# Patient Record
Sex: FEMALE | Race: OTHER | HISPANIC OR LATINO
[De-identification: names, ages, dates, MRNs, and addresses within clinical notes are randomized per-mention and may not be internally consistent; named-entity substitution may affect disease eponyms.]

---

## 2019-03-01 PROBLEM — Z00.00 ENCOUNTER FOR PREVENTIVE HEALTH EXAMINATION: Status: ACTIVE | Noted: 2019-03-01

## 2019-03-05 ENCOUNTER — APPOINTMENT (OUTPATIENT)
Dept: HEART AND VASCULAR | Facility: CLINIC | Age: 59
End: 2019-03-05
Payer: MEDICAID

## 2019-03-05 VITALS
HEART RATE: 72 BPM | SYSTOLIC BLOOD PRESSURE: 170 MMHG | BODY MASS INDEX: 33.13 KG/M2 | HEIGHT: 62 IN | RESPIRATION RATE: 16 BRPM | WEIGHT: 180 LBS | DIASTOLIC BLOOD PRESSURE: 100 MMHG

## 2019-03-05 DIAGNOSIS — Z78.9 OTHER SPECIFIED HEALTH STATUS: ICD-10-CM

## 2019-03-05 DIAGNOSIS — Z82.49 FAMILY HISTORY OF ISCHEMIC HEART DISEASE AND OTHER DISEASES OF THE CIRCULATORY SYSTEM: ICD-10-CM

## 2019-03-05 DIAGNOSIS — E78.5 HYPERLIPIDEMIA, UNSPECIFIED: ICD-10-CM

## 2019-03-05 DIAGNOSIS — Z82.0 FAMILY HISTORY OF EPILEPSY AND OTHER DISEASES OF THE NERVOUS SYSTEM: ICD-10-CM

## 2019-03-05 PROCEDURE — 99204 OFFICE O/P NEW MOD 45 MIN: CPT

## 2019-03-05 NOTE — PHYSICAL EXAM
[General Appearance - Well Developed] : well developed [Normal Appearance] : normal appearance [Well Groomed] : well groomed [General Appearance - Well Nourished] : well nourished [No Deformities] : no deformities [General Appearance - In No Acute Distress] : no acute distress [Normal Oral Mucosa] : normal oral mucosa [No Oral Pallor] : no oral pallor [No Oral Cyanosis] : no oral cyanosis [Heart Rate And Rhythm] : heart rate and rhythm were normal [Heart Sounds] : normal S1 and S2 [Respiration, Rhythm And Depth] : normal respiratory rhythm and effort [Exaggerated Use Of Accessory Muscles For Inspiration] : no accessory muscle use [Auscultation Breath Sounds / Voice Sounds] : lungs were clear to auscultation bilaterally [Abdomen Soft] : soft [Abdomen Tenderness] : non-tender [Abdomen Mass (___ Cm)] : no abdominal mass palpated [Nail Clubbing] : no clubbing of the fingernails [Cyanosis, Localized] : no localized cyanosis [Petechial Hemorrhages (___cm)] : no petechial hemorrhages [] : no ischemic changes [Oriented To Time, Place, And Person] : oriented to person, place, and time [Affect] : the affect was normal [Mood] : the mood was normal [No Anxiety] : not feeling anxious [Systolic grade ___/6] : A grade [unfilled]/6 systolic murmur was heard.

## 2019-03-05 NOTE — REASON FOR VISIT
[Initial Evaluation] : an initial evaluation of [FreeTextEntry1] : 58 year old F with history of HTN diagnosed 12 years ago, family history of HTN here for evaluation of HTN and murmur. She denies chest pain, dyspnea, orthopnea, PND, edema. \par \par EKG yesterday NSR at 61 bpm with STT wave inv I, aVL, V4-V6. \par EKG 05/09/2013: NSR at 79 bpm with nonspecific STT abn in I, aVL, V5-V6

## 2019-03-05 NOTE — DISCUSSION/SUMMARY
[Hypertension] : hypertension [Stable] : stable [Echocardiogram] : echocardiogram [Exercise Regimen] : an exercise regimen [Weight Loss] : weight loss [Sodium Restriction] : sodium restriction [de-identified] :  Amlodipine to 10 mg PO daily - just increased today; keep BP log [FreeTextEntry4] : Murmur - check echocardiogram; Retrieve labs done at [facility] office [FreeTextEntry3] : after testing completed

## 2019-03-13 PROBLEM — I10 HYPERTENSION, UNSPECIFIED TYPE: Status: ACTIVE | Noted: 2019-03-05

## 2019-03-13 PROBLEM — R01.1 MURMUR, CARDIAC: Status: ACTIVE | Noted: 2019-03-13

## 2019-03-18 ENCOUNTER — APPOINTMENT (OUTPATIENT)
Dept: HEART AND VASCULAR | Facility: CLINIC | Age: 59
End: 2019-03-18
Payer: MEDICAID

## 2019-03-18 DIAGNOSIS — I10 ESSENTIAL (PRIMARY) HYPERTENSION: ICD-10-CM

## 2019-03-18 DIAGNOSIS — R01.1 CARDIAC MURMUR, UNSPECIFIED: ICD-10-CM

## 2019-03-18 PROCEDURE — 93306 TTE W/DOPPLER COMPLETE: CPT

## 2019-04-02 ENCOUNTER — APPOINTMENT (OUTPATIENT)
Dept: HEART AND VASCULAR | Facility: CLINIC | Age: 59
End: 2019-04-02
Payer: MEDICAID

## 2019-04-02 VITALS
HEIGHT: 61.5 IN | RESPIRATION RATE: 14 BRPM | WEIGHT: 178 LBS | HEART RATE: 84 BPM | DIASTOLIC BLOOD PRESSURE: 88 MMHG | BODY MASS INDEX: 33.18 KG/M2 | SYSTOLIC BLOOD PRESSURE: 158 MMHG

## 2019-04-02 PROCEDURE — 99214 OFFICE O/P EST MOD 30 MIN: CPT

## 2019-04-02 NOTE — DISCUSSION/SUMMARY
[Hypertension] : hypertension [Stable] : stable [Exercise Regimen] : an exercise regimen [Weight Loss] : weight loss [Sodium Restriction] : sodium restriction [___ Month(s)] : [unfilled] month(s) [de-identified] :  Amlodipine to 10 mg PO daily and start Carvedilol 6.25 mg PO BID -  keep BP log

## 2019-04-02 NOTE — PHYSICAL EXAM
[General Appearance - Well Developed] : well developed [Normal Appearance] : normal appearance [Well Groomed] : well groomed [General Appearance - Well Nourished] : well nourished [No Deformities] : no deformities [General Appearance - In No Acute Distress] : no acute distress [Normal Oral Mucosa] : normal oral mucosa [No Oral Pallor] : no oral pallor [No Oral Cyanosis] : no oral cyanosis [Respiration, Rhythm And Depth] : normal respiratory rhythm and effort [Exaggerated Use Of Accessory Muscles For Inspiration] : no accessory muscle use [Auscultation Breath Sounds / Voice Sounds] : lungs were clear to auscultation bilaterally [Heart Rate And Rhythm] : heart rate and rhythm were normal [Heart Sounds] : normal S1 and S2 [Systolic grade ___/6] : A grade [unfilled]/6 systolic murmur was heard. [Abdomen Soft] : soft [Abdomen Tenderness] : non-tender [Abdomen Mass (___ Cm)] : no abdominal mass palpated [Nail Clubbing] : no clubbing of the fingernails [Cyanosis, Localized] : no localized cyanosis [Petechial Hemorrhages (___cm)] : no petechial hemorrhages [] : no ischemic changes [Oriented To Time, Place, And Person] : oriented to person, place, and time [Affect] : the affect was normal [Mood] : the mood was normal [No Anxiety] : not feeling anxious

## 2019-04-02 NOTE — REASON FOR VISIT
[Initial Evaluation] : an initial evaluation of [FreeTextEntry1] : 58 year old F with history of HTN diagnosed 12 years ago, family history of HTN here for evaluation of HTN and murmur. She denies chest pain, dyspnea, orthopnea, PND, edema. \par \par Labs reviewed 02/2019: CMP WNL; HDL 36; Total chol 210; ; ; A1C 5.4\par \par BP readings at home have been 's-160's. She takes her Amlodipine 10 mg PO in AM (430 am). She has tried Lisinopril in the past however she was coughing after taking the medication. \par \par Echocardiogram 03/18/2019: Trace MR; Mild concentric LVH; LVEF 65%; Trace TR. PASP 21 mm Hg. \par \par EKG 03/04/2019 NSR at 61 bpm with STT wave inv I, aVL, V4-V6. \par EKG 05/09/2013: NSR at 79 bpm with nonspecific STT abn in I, aVL, V5-V6

## 2019-08-20 ENCOUNTER — APPOINTMENT (OUTPATIENT)
Dept: HEART AND VASCULAR | Facility: CLINIC | Age: 59
End: 2019-08-20
Payer: MEDICAID

## 2019-08-20 VITALS
SYSTOLIC BLOOD PRESSURE: 148 MMHG | HEIGHT: 61.5 IN | WEIGHT: 183 LBS | BODY MASS INDEX: 34.11 KG/M2 | HEART RATE: 64 BPM | RESPIRATION RATE: 16 BRPM | DIASTOLIC BLOOD PRESSURE: 70 MMHG

## 2019-08-20 PROCEDURE — 99214 OFFICE O/P EST MOD 30 MIN: CPT

## 2019-08-20 NOTE — PHYSICAL EXAM
[Normal Appearance] : normal appearance [General Appearance - Well Developed] : well developed [Well Groomed] : well groomed [General Appearance - Well Nourished] : well nourished [No Deformities] : no deformities [General Appearance - In No Acute Distress] : no acute distress [Normal Oral Mucosa] : normal oral mucosa [No Oral Pallor] : no oral pallor [No Oral Cyanosis] : no oral cyanosis [Respiration, Rhythm And Depth] : normal respiratory rhythm and effort [Exaggerated Use Of Accessory Muscles For Inspiration] : no accessory muscle use [Auscultation Breath Sounds / Voice Sounds] : lungs were clear to auscultation bilaterally [Heart Rate And Rhythm] : heart rate and rhythm were normal [Heart Sounds] : normal S1 and S2 [Systolic grade ___/6] : A grade [unfilled]/6 systolic murmur was heard. [Abdomen Soft] : soft [Abdomen Tenderness] : non-tender [Abdomen Mass (___ Cm)] : no abdominal mass palpated [Nail Clubbing] : no clubbing of the fingernails [Cyanosis, Localized] : no localized cyanosis [Petechial Hemorrhages (___cm)] : no petechial hemorrhages [] : no ischemic changes [Oriented To Time, Place, And Person] : oriented to person, place, and time [Affect] : the affect was normal [No Anxiety] : not feeling anxious [Mood] : the mood was normal

## 2019-08-20 NOTE — DISCUSSION/SUMMARY
[Stable] : stable [Hypertension] : hypertension [Exercise Regimen] : an exercise regimen [Weight Loss] : weight loss [Sodium Restriction] : sodium restriction [___ Month(s)] : [unfilled] month(s) [de-identified] : still elevated [de-identified] :  Amlodipine to 10 mg PO daily and increase Carvedilol to 12.5 mg  PO BID -  keep BP log

## 2019-08-20 NOTE — REASON FOR VISIT
[Initial Evaluation] : an initial evaluation of [FreeTextEntry1] : 58 year old F with history of HTN diagnosed 12 years ago, family history of HTN here for routine followup. She was taking her BP up until June/July. Her SBP readings were 162 mm HG. . She denies chest pain, dyspnea, orthopnea, PND, edema. \par \par Labs reviewed 02/2019: CMP WNL; HDL 36; Total chol 210; ; ; A1C 5.4\par \par Echocardiogram 03/18/2019: Trace MR; Mild concentric LVH; LVEF 65%; Trace TR. PASP 21 mm Hg. \par \par EKG 03/04/2019 NSR at 61 bpm with STT wave inv I, aVL, V4-V6. \par EKG 05/09/2013: NSR at 79 bpm with nonspecific STT abn in I, aVL, V5-V6

## 2019-10-22 ENCOUNTER — APPOINTMENT (OUTPATIENT)
Dept: HEART AND VASCULAR | Facility: CLINIC | Age: 59
End: 2019-10-22
Payer: MEDICAID

## 2019-10-22 VITALS
WEIGHT: 178 LBS | SYSTOLIC BLOOD PRESSURE: 172 MMHG | HEART RATE: 66 BPM | HEIGHT: 61.5 IN | RESPIRATION RATE: 12 BRPM | DIASTOLIC BLOOD PRESSURE: 96 MMHG | BODY MASS INDEX: 33.18 KG/M2

## 2019-10-22 PROCEDURE — 99214 OFFICE O/P EST MOD 30 MIN: CPT

## 2019-10-22 NOTE — PHYSICAL EXAM
[Normal Appearance] : normal appearance [General Appearance - Well Developed] : well developed [Well Groomed] : well groomed [No Deformities] : no deformities [General Appearance - Well Nourished] : well nourished [General Appearance - In No Acute Distress] : no acute distress [Normal Oral Mucosa] : normal oral mucosa [No Oral Pallor] : no oral pallor [Respiration, Rhythm And Depth] : normal respiratory rhythm and effort [No Oral Cyanosis] : no oral cyanosis [Auscultation Breath Sounds / Voice Sounds] : lungs were clear to auscultation bilaterally [Exaggerated Use Of Accessory Muscles For Inspiration] : no accessory muscle use [Heart Sounds] : normal S1 and S2 [Heart Rate And Rhythm] : heart rate and rhythm were normal [Systolic grade ___/6] : A grade [unfilled]/6 systolic murmur was heard. [Abdomen Soft] : soft [Abdomen Mass (___ Cm)] : no abdominal mass palpated [Abdomen Tenderness] : non-tender [Nail Clubbing] : no clubbing of the fingernails [Cyanosis, Localized] : no localized cyanosis [Petechial Hemorrhages (___cm)] : no petechial hemorrhages [] : no ischemic changes [Affect] : the affect was normal [Oriented To Time, Place, And Person] : oriented to person, place, and time [Mood] : the mood was normal [No Anxiety] : not feeling anxious

## 2019-10-22 NOTE — REASON FOR VISIT
[Initial Evaluation] : an initial evaluation of [FreeTextEntry1] : 59 year old F with history of HTN diagnosed 12 years ago, family history of HTN here for routine followup. She denies chest pain, dyspnea, orthopnea, PND, edema. BP elevated today. Her BP readings at home have been 140's SBP. \par \par  line used: Brice [id number]\par \par Labs reviewed 02/2019: CMP WNL; HDL 36; Total chol 210; ; ; A1C 5.4\par \par Echocardiogram 03/18/2019: Trace MR; Mild concentric LVH; LVEF 65%; Trace TR. PASP 21 mm Hg. \par \par EKG 03/04/2019 NSR at 61 bpm with STT wave inv I, aVL, V4-V6. \par EKG 05/09/2013: NSR at 79 bpm with nonspecific STT abn in I, aVL, V5-V6

## 2019-10-22 NOTE — DISCUSSION/SUMMARY
[Hypertension] : hypertension [Weight Loss] : weight loss [Exercise Regimen] : an exercise regimen [___ Month(s)] : [unfilled] month(s) [Sodium Restriction] : sodium restriction [Not Responding to Treatment] : not responding to treatment [de-identified] : elevated [de-identified] :  Amlodipine to 10 mg PO daily and increase Carvedilol to 25 mg PO BID -  continue keep BP log

## 2020-01-28 ENCOUNTER — APPOINTMENT (OUTPATIENT)
Dept: HEART AND VASCULAR | Facility: CLINIC | Age: 60
End: 2020-01-28
Payer: MEDICAID

## 2020-01-28 VITALS
SYSTOLIC BLOOD PRESSURE: 167 MMHG | HEART RATE: 62 BPM | BODY MASS INDEX: 32.99 KG/M2 | RESPIRATION RATE: 16 BRPM | DIASTOLIC BLOOD PRESSURE: 80 MMHG | WEIGHT: 177 LBS | HEIGHT: 61.5 IN

## 2020-01-28 PROCEDURE — 99214 OFFICE O/P EST MOD 30 MIN: CPT

## 2020-01-28 NOTE — REASON FOR VISIT
[Initial Evaluation] : an initial evaluation of [FreeTextEntry1] : 59 year old F with history of HTN diagnosed 12 years ago, family history of HTN here for routine followup. She denies chest pain, dyspnea, orthopnea, PND, edema. BP elevated today, however has not been checking BP at home. Needs another BP machine. \par \par Daughter here for translation. \par Labs 09/2019: A1C 5.6; CMP WNL; Total chol 151; HDL 43; ; LDL 85\par \par Labs reviewed 02/2019: CMP WNL; HDL 36; Total chol 210; ; ; A1C 5.4\par \par Echocardiogram 03/18/2019: Trace MR; Mild concentric LVH; LVEF 65%; Trace TR. PASP 21 mm Hg. \par \par EKG 03/04/2019 NSR at 61 bpm with STT wave inv I, aVL, V4-V6. \par EKG 05/09/2013: NSR at 79 bpm with nonspecific STT abn in I, aVL, V5-V6

## 2020-01-28 NOTE — DISCUSSION/SUMMARY
[Hypertension] : hypertension [Not Responding to Treatment] : not responding to treatment [Exercise Regimen] : an exercise regimen [Weight Loss] : weight loss [Sodium Restriction] : sodium restriction [___ Month(s)] : [unfilled] month(s) [de-identified] : elevated [de-identified] :  Amlodipine to 10 mg PO daily and Carvedilol to 25 mg PO BID -  asked patient to keep BP log, to get machine Speaking Coherently

## 2020-01-28 NOTE — PHYSICAL EXAM
[General Appearance - Well Developed] : well developed [Normal Appearance] : normal appearance [Well Groomed] : well groomed [General Appearance - Well Nourished] : well nourished [No Deformities] : no deformities [General Appearance - In No Acute Distress] : no acute distress [Normal Oral Mucosa] : normal oral mucosa [No Oral Pallor] : no oral pallor [No Oral Cyanosis] : no oral cyanosis [Respiration, Rhythm And Depth] : normal respiratory rhythm and effort [Exaggerated Use Of Accessory Muscles For Inspiration] : no accessory muscle use [Auscultation Breath Sounds / Voice Sounds] : lungs were clear to auscultation bilaterally [Heart Rate And Rhythm] : heart rate and rhythm were normal [Heart Sounds] : normal S1 and S2 [Systolic grade ___/6] : A grade [unfilled]/6 systolic murmur was heard. [Abdomen Soft] : soft [Abdomen Tenderness] : non-tender [Abdomen Mass (___ Cm)] : no abdominal mass palpated [Nail Clubbing] : no clubbing of the fingernails [Cyanosis, Localized] : no localized cyanosis [Petechial Hemorrhages (___cm)] : no petechial hemorrhages [Oriented To Time, Place, And Person] : oriented to person, place, and time [] : no ischemic changes [Affect] : the affect was normal [Mood] : the mood was normal [No Anxiety] : not feeling anxious

## 2020-05-20 ENCOUNTER — APPOINTMENT (OUTPATIENT)
Dept: NEUROLOGY | Facility: CLINIC | Age: 60
End: 2020-05-20
Payer: MEDICAID

## 2020-05-20 DIAGNOSIS — I10 ESSENTIAL (PRIMARY) HYPERTENSION: ICD-10-CM

## 2020-05-20 DIAGNOSIS — E55.9 VITAMIN D DEFICIENCY, UNSPECIFIED: ICD-10-CM

## 2020-05-20 PROCEDURE — 99443: CPT

## 2020-05-20 NOTE — ASSESSMENT
[FreeTextEntry1] : Her symptoms and distribution of the pain are consistent with meralgia paresthetica. She will begin Gabapentin 300 mg with increase to twice daily.\par Vitamin D 1000 units daily.

## 2020-05-20 NOTE — HISTORY OF PRESENT ILLNESS
[Home] : at home, [unfilled] , at the time of the visit. [Other Location: e.g. Home (Enter Location, City,State)___] : at [unfilled] [Verbal consent obtained from patient] : the patient, [unfilled] [[employer] ] : provided by [employer]   [FreeTextEntry3] : [id number] [TWNoteComboBox1] : Greek [FreeTextEntry1] : This is a 60 y/o woman who is being seen in neurologic consultation for evaluation of right thigh burning. Pain is in the outer/lateral aspect of her thigh. This started about a year ago. She was placed on Ibuprofen which didn’t help as it increased her bp. she has no back pain. Pain is significant when she stands (6-7/10). There is no loss of strength. When she lies down at night, she has a lot of cramping. No numbness is noted. No burning in her feet or left leg. Prolonged standing worsens her discomfort. She does not wear super tight jeans.\par Reviewed her labs and noted vitamin D to be low.

## 2020-05-20 NOTE — HISTORY OF PRESENT ILLNESS
[Home] : at home, [unfilled] , at the time of the visit. [Other Location: e.g. Home (Enter Location, City,State)___] : at [unfilled] [Verbal consent obtained from patient] : the patient, [unfilled] [[employer] ] : provided by [employer]   [FreeTextEntry3] : [id number] [TWNoteComboBox1] : Pakistani [FreeTextEntry1] : This is a 60 y/o woman who is being seen in neurologic consultation for evaluation of right thigh burning. Pain is in the outer/lateral aspect of her thigh. This started about a year ago. She was placed on Ibuprofen which didn’t help as it increased her bp. she has no back pain. Pain is significant when she stands (6-7/10). There is no loss of strength. When she lies down at night, she has a lot of cramping. No numbness is noted. No burning in her feet or left leg. Prolonged standing worsens her discomfort. She does not wear super tight jeans.\par Reviewed her labs and noted vitamin D to be low.

## 2020-07-21 ENCOUNTER — APPOINTMENT (OUTPATIENT)
Dept: HEART AND VASCULAR | Facility: CLINIC | Age: 60
End: 2020-07-21

## 2020-07-23 ENCOUNTER — APPOINTMENT (OUTPATIENT)
Dept: NEUROLOGY | Facility: CLINIC | Age: 60
End: 2020-07-23
Payer: MEDICAID

## 2020-07-23 VITALS
HEART RATE: 64 BPM | SYSTOLIC BLOOD PRESSURE: 139 MMHG | DIASTOLIC BLOOD PRESSURE: 80 MMHG | BODY MASS INDEX: 29.71 KG/M2 | HEIGHT: 64 IN | WEIGHT: 174 LBS

## 2020-07-23 PROCEDURE — 99214 OFFICE O/P EST MOD 30 MIN: CPT

## 2020-07-23 NOTE — ASSESSMENT
[FreeTextEntry1] : Sujey Jackson is a 59 year old woman with meralgia paresthetica.  \par \par She will increase the dose of Gabapentin to 300mg twice daily and in two weeks will increase the dose to 300mg three times daily. She is aware of possible side effects of drowsiness.\par \par Advised on weight loss and to avoid tight clothing to help alleviate the pressure on the lateral femoral cutaneous nerve.\par \par Follow up in four months.

## 2020-07-23 NOTE — PHYSICAL EXAM
[FreeTextEntry1] : Physical examination \par General: No acute distress, Awake, Alert.   \par \par Mental status \par Awake, alert, Normal attention span and concentration, Language intact.\par  \par Cranial Nerves \par II: VFF  \par III, IV, VI: PERRL, EOMI.   \par V: Facial sensation is normal B/L.   \par VII: Facial strength is normal B/L. \par \par \par VIII: Gross hearing is intact.   \par \par \par IX, X: Palate is midline and elevates symmetrically.   \par XI: Trapezius normal strength.   \par XII: Tongue midline without atrophy or fasciculations. \par \par Motor exam  \par Muscle tone - no evidence of rigidity or resistance in all 4 extremities.  \par No atrophy or fasciculations \par Muscle Strength: arms and legs, proximal and distal flexors and extensors are normal \par \par No UE drift.\par \par Reflexes \par All present, normal, and symmetrical.   \par 3+ patellar bilaterally. \par ankles: 1 + bilaterally\par \par Plantars right: mute.   \par Plantars left: mute.\par \par \par Sensory \par Intact sensation to pinprick. \par Intact Proprioception. \par Hyperesthesia to right lateral thigh. \par \par Gait \par Normal gait.  \par \par \par

## 2020-07-23 NOTE — HISTORY OF PRESENT ILLNESS
[Home] : at home, [unfilled] , at the time of the visit. [Other Location: e.g. Home (Enter Location, City,State)___] : at [unfilled] [[employer] ] : provided by [employer]   [Verbal consent obtained from patient] : the patient, [unfilled] [FreeTextEntry3] : [id number] [TWNoteComboBox1] : Latvian [FreeTextEntry1] : Sujey Jackson is a 59 year old woman seen for a follow up of right thigh burning.   aided during visit.\par \par She reports the numbness and feeling of burning is the same with Gabapentin.  She is taking 200mg of Gabapentin nightly, this is different than what was prescribed to her on the last visit.  She denies any weakness or that the pain disturbs her sleep.  Ms. Jackson does not endorse any back pain or history of diabetes.  \par \par The remaining neurological review of systems is negative. \par \par 5/20/20: This is a 58 y/o woman who is being seen in neurologic consultation for evaluation of right thigh burning. Pain is in the outer/lateral aspect of her thigh. This started about a year ago. She was placed on Ibuprofen which didn’t help as it increased her bp. she has no back pain. Pain is significant when she stands (6-7/10). There is no loss of strength. When she lies down at night, she has a lot of cramping. No numbness is noted. No burning in her feet or left leg. Prolonged standing worsens her discomfort. She does not wear super tight jeans.\par Reviewed her labs and noted vitamin D to be low.

## 2020-07-23 NOTE — REASON FOR VISIT
[Consultation] : a consultation visit [FreeTextEntry1] : Numbness and burning in right leg [FreeTextEntry2] : Rachael  [TWNoteComboBox1] : Lebanese

## 2020-09-29 ENCOUNTER — APPOINTMENT (OUTPATIENT)
Dept: HEART AND VASCULAR | Facility: CLINIC | Age: 60
End: 2020-09-29
Payer: MEDICAID

## 2020-09-29 VITALS
SYSTOLIC BLOOD PRESSURE: 168 MMHG | OXYGEN SATURATION: 100 % | HEIGHT: 64 IN | DIASTOLIC BLOOD PRESSURE: 90 MMHG | WEIGHT: 186 LBS | RESPIRATION RATE: 16 BRPM | HEART RATE: 68 BPM | BODY MASS INDEX: 31.76 KG/M2 | TEMPERATURE: 98.5 F

## 2020-09-29 PROCEDURE — 99214 OFFICE O/P EST MOD 30 MIN: CPT

## 2020-09-29 NOTE — REASON FOR VISIT
[Initial Evaluation] : an initial evaluation of [FreeTextEntry1] : 59 year old F with history of HTN diagnosed 12 years ago, family history of HTN here for routine followup. She denies chest pain, dyspnea, orthopnea, PND, edema. BP elevated today, however has not been checking BP at home. She has been taking Carvedilol 12.5 mg PO BID and not the prescribed 25 mg PO BID. \par \par Daughter here for translation. \par Labs 09/2019: A1C 5.6; CMP WNL; Total chol 151; HDL 43; ; LDL 85\par \par Labs reviewed 02/2019: CMP WNL; HDL 36; Total chol 210; ; ; A1C 5.4\par \par Echocardiogram 03/18/2019: Trace MR; Mild concentric LVH; LVEF 65%; Trace TR. PASP 21 mm Hg. \par \par EKG 03/04/2019 NSR at 61 bpm with STT wave inv I, aVL, V4-V6. \par EKG 05/09/2013: NSR at 79 bpm with nonspecific STT abn in I, aVL, V5-V6

## 2020-09-29 NOTE — DISCUSSION/SUMMARY
[Hypertension] : hypertension [Not Responding to Treatment] : not responding to treatment [Exercise Regimen] : an exercise regimen [Weight Loss] : weight loss [Sodium Restriction] : sodium restriction [___ Month(s)] : [unfilled] month(s) [de-identified] : elevated [de-identified] :  c/w Amlodipine 10 mg PO daily and increase Carvedilol to 25 mg PO BID (refilled)-  asked patient to keep BP log, to get machine [FreeTextEntry4] : Increase water hydration

## 2020-10-29 ENCOUNTER — APPOINTMENT (OUTPATIENT)
Dept: HEART AND VASCULAR | Facility: CLINIC | Age: 60
End: 2020-10-29
Payer: MEDICAID

## 2020-10-29 PROCEDURE — 93306 TTE W/DOPPLER COMPLETE: CPT

## 2020-10-29 PROCEDURE — 99072 ADDL SUPL MATRL&STAF TM PHE: CPT

## 2020-11-06 ENCOUNTER — NON-APPOINTMENT (OUTPATIENT)
Age: 60
End: 2020-11-06

## 2020-11-16 ENCOUNTER — RX RENEWAL (OUTPATIENT)
Age: 60
End: 2020-11-16

## 2020-11-18 ENCOUNTER — APPOINTMENT (OUTPATIENT)
Dept: NEUROLOGY | Facility: CLINIC | Age: 60
End: 2020-11-18
Payer: MEDICAID

## 2020-11-18 VITALS
HEIGHT: 64 IN | BODY MASS INDEX: 31.58 KG/M2 | WEIGHT: 185 LBS | DIASTOLIC BLOOD PRESSURE: 83 MMHG | SYSTOLIC BLOOD PRESSURE: 146 MMHG | TEMPERATURE: 97.7 F | HEART RATE: 66 BPM

## 2020-11-18 PROCEDURE — 99214 OFFICE O/P EST MOD 30 MIN: CPT

## 2020-11-18 RX ORDER — GABAPENTIN 100 MG/1
100 CAPSULE ORAL
Qty: 270 | Refills: 1 | Status: DISCONTINUED | COMMUNITY
Start: 2020-05-20 | End: 2020-11-18

## 2020-11-18 NOTE — PHYSICAL EXAM
[FreeTextEntry1] : Physical examination \par General: No acute distress, Awake, Alert.   \par \par Mental status \par Awake, alert, Normal attention span and concentration, Language intact.\par  Cranial Nerves \par II: VFF  \par III, IV, VI: PERRL, EOMI.   \par V: Facial sensation is normal B/L.   \par VII: Facial strength is normal B/L. \par VIII: Gross hearing is intact.   \par IX, X: Palate is midline and elevates symmetrically.   \par XI: Trapezius normal strength.   \par XII: Tongue midline without atrophy or fasciculations. \par \par Motor exam  \par Muscle tone - no evidence of rigidity or resistance in all 4 extremities.  \par No atrophy or fasciculations \par Muscle Strength: arms and legs, proximal and distal flexors and extensors are normal \par \par No UE drift.\par \par Reflexes \par All present, normal, and symmetrical.   \par 3+ patellar bilaterally. \par ankles: 1 + bilaterally\par \par Plantars right: mute.   \par Plantars left: mute.\par \par Sensory \par Intact sensation to pinprick. \par Intact Proprioception. \par Hyperesthesia to right lateral thigh. \par \par Gait \par Normal gait.  \par \par \par

## 2020-11-18 NOTE — HISTORY OF PRESENT ILLNESS
[FreeTextEntry1] : 11/18/2020\par Patient is here in followup. She notes improvement in the sensation of burning with gabapentin 300 mg 3 times a day. She does not have any side effects to this. She says if she stands for a long time she starts noticing cramping in her right thigh. She does not have low back pain. She does not have severe pain in her legs. The numbness only occurs in her right lateral thigh. She did not have loss of strength in her legs. She does have a lot of cramps in her legs if she stands for too long and also at nighttime.\par \par 7/23/2020\par Sujey Jackson is a 59 year old woman seen for a follow up of right thigh burning.   aided during visit.\par She reports the numbness and feeling of burning is the same with Gabapentin.  She is taking 200mg of Gabapentin nightly, this is different than what was prescribed to her on the last visit.  She denies any weakness or that the pain disturbs her sleep.  Ms. Jackson does not endorse any back pain or history of diabetes.  \par \par The remaining neurological review of systems is negative. \par \par 5/20/20: This is a 60 y/o woman who is being seen in neurologic consultation for evaluation of right thigh burning. Pain is in the outer/lateral aspect of her thigh. This started about a year ago. She was placed on Ibuprofen which didn’t help as it increased her bp. she has no back pain. Pain is significant when she stands (6-7/10). There is no loss of strength. When she lies down at night, she has a lot of cramping. No numbness is noted. No burning in her feet or left leg. Prolonged standing worsens her discomfort. She does not wear super tight jeans.\par Reviewed her labs and noted vitamin D to be low.

## 2020-11-18 NOTE — REASON FOR VISIT
[Follow-Up: _____] : a [unfilled] follow-up visit [facility] : [id number] [FreeTextEntry2] : Rachael  [TWNoteComboBox1] : Indian

## 2020-11-18 NOTE — ASSESSMENT
[FreeTextEntry1] : Continue gabapentin 300 mg 3 times a day.\par Add magnesium supplements 400 mg daily over-the-counter.\par Try tonic water at bedtime. Both the magnesium and tonic water will hopefully aided with the cramping she experiences at night.\par Encouraged weight loss.\par Followup in 6 months.

## 2021-03-09 ENCOUNTER — APPOINTMENT (OUTPATIENT)
Dept: HEART AND VASCULAR | Facility: CLINIC | Age: 61
End: 2021-03-09
Payer: MEDICAID

## 2021-03-09 VITALS
WEIGHT: 182 LBS | HEIGHT: 64 IN | BODY MASS INDEX: 31.07 KG/M2 | SYSTOLIC BLOOD PRESSURE: 168 MMHG | HEART RATE: 68 BPM | RESPIRATION RATE: 16 BRPM | DIASTOLIC BLOOD PRESSURE: 90 MMHG | OXYGEN SATURATION: 97 % | TEMPERATURE: 97.9 F

## 2021-03-09 PROCEDURE — 99072 ADDL SUPL MATRL&STAF TM PHE: CPT

## 2021-03-09 PROCEDURE — 99214 OFFICE O/P EST MOD 30 MIN: CPT

## 2021-03-09 RX ORDER — CARVEDILOL 12.5 MG/1
12.5 TABLET, FILM COATED ORAL
Qty: 60 | Refills: 0 | Status: DISCONTINUED | COMMUNITY
Start: 2020-06-03

## 2021-03-12 NOTE — REASON FOR VISIT
[Initial Evaluation] : an initial evaluation of [FreeTextEntry1] : 60 year old F with history of HTN, family history of HTN here for routine followup. She was last seen Sept 2020. She denies chest pain, dyspnea, orthopnea, PND, edema.  Her BP is elevated today. \par \par Daughter here for translation. \par Labs May 2020: CBC WNL; Total chol 196; HDL 36; ; ; A1C 5.6\par Labs 09/2019: A1C 5.6; CMP WNL; Total chol 151; HDL 43; ; LDL 85\par Labs reviewed 02/2019: CMP WNL; HDL 36; Total chol 210; ; ; A1C 5.4\par \par Echocardiogram 03/18/2019: Trace MR; Mild concentric LVH; LVEF 65%; Trace TR. PASP 21 mm Hg. \par \par EKG 03/04/2019 NSR at 61 bpm with STT wave inv I, aVL, V4-V6. \par EKG 05/09/2013: NSR at 79 bpm with nonspecific STT abn in I, aVL, V5-V6

## 2021-03-12 NOTE — DISCUSSION/SUMMARY
[Hypertension] : hypertension [Not Responding to Treatment] : not responding to treatment [Exercise Regimen] : an exercise regimen [Weight Loss] : weight loss [Sodium Restriction] : sodium restriction [___ Month(s)] : [unfilled] month(s) [de-identified] : elevated [de-identified] :  c/w Amlodipine 10 mg PO daily and Carvedilol to 25 mg PO BID. Start Losartan 25 mg PO daily. Asked patient to keep BP log, to get machine

## 2021-05-18 ENCOUNTER — APPOINTMENT (OUTPATIENT)
Dept: NEUROLOGY | Facility: CLINIC | Age: 61
End: 2021-05-18
Payer: MEDICAID

## 2021-05-18 VITALS
WEIGHT: 184 LBS | BODY MASS INDEX: 29.57 KG/M2 | HEIGHT: 66 IN | DIASTOLIC BLOOD PRESSURE: 80 MMHG | TEMPERATURE: 97.5 F | HEART RATE: 63 BPM | SYSTOLIC BLOOD PRESSURE: 131 MMHG

## 2021-05-18 DIAGNOSIS — R25.2 CRAMP AND SPASM: ICD-10-CM

## 2021-05-18 PROCEDURE — 99072 ADDL SUPL MATRL&STAF TM PHE: CPT

## 2021-05-18 PROCEDURE — 99213 OFFICE O/P EST LOW 20 MIN: CPT

## 2021-05-18 NOTE — PHYSICAL EXAM
[FreeTextEntry1] : Physical examination \par General: No acute distress, Awake, Alert.   \par \par Mental status \par Awake, alert, Normal attention span and concentration, Language intact.\par  Cranial Nerves \par II: VFF  \par III, IV, VI: PERRL, EOMI.   \par V: Facial sensation is normal B/L.   \par VII: Facial strength is normal B/L. \par VIII: Gross hearing is intact.   \par IX, X: Palate is midline and elevates symmetrically.   \par XI: Trapezius normal strength.   \par XII: Tongue midline without atrophy or fasciculations. \par \par Motor exam  \par Muscle tone - no evidence of rigidity or resistance in all 4 extremities.  \par No atrophy or fasciculations \par Muscle Strength: arms and legs, proximal and distal flexors and extensors are normal \par \par No UE drift.\par \par Reflexes \par All present, normal, and symmetrical.   \par 3+ patellar bilaterally. \par ankles: 1 + bilaterally\par \par \par Sensory \par Intact sensation to pinprick. \par Intact Proprioception. \par Hyperesthesia to right lateral thigh. \par \par Gait \par Normal gait.  \par \par \par

## 2021-05-18 NOTE — ASSESSMENT
[FreeTextEntry1] : Increase gabapentin 300 mg 1 in am, 1 in the afternoon and 2 at night.  \par Continue magnesium supplements 400 mg daily over-the-counter.\par \par Encouraged weight loss.\par Followup in 6 months.

## 2021-05-18 NOTE — HISTORY OF PRESENT ILLNESS
[FreeTextEntry1] : 5/18/21\par Here in f/u with daughter. Notes burning is less with gabapentin. Only occurs if she is on her feet a lot. \par No further cramps. No LBP. balance stable. strength normal.\par No changes to health history.\par \par 11/18/2020\par Patient is here in followup. She notes improvement in the sensation of burning with gabapentin 300 mg 3 times a day. She does not have any side effects to this. She says if she stands for a long time she starts noticing cramping in her right thigh. She does not have low back pain. She does not have severe pain in her legs. The numbness only occurs in her right lateral thigh. She did not have loss of strength in her legs. She does have a lot of cramps in her legs if she stands for too long and also at nighttime.\par \par 7/23/2020\par Sujey Jackson is a 59 year old woman seen for a follow up of right thigh burning.   aided during visit.\par She reports the numbness and feeling of burning is the same with Gabapentin.  She is taking 200mg of Gabapentin nightly, this is different than what was prescribed to her on the last visit.  She denies any weakness or that the pain disturbs her sleep.  Ms. Jackson does not endorse any back pain or history of diabetes.  \par \par The remaining neurological review of systems is negative. \par \par 5/20/20: This is a 60 y/o woman who is being seen in neurologic consultation for evaluation of right thigh burning. Pain is in the outer/lateral aspect of her thigh. This started about a year ago. She was placed on Ibuprofen which didn’t help as it increased her bp. she has no back pain. Pain is significant when she stands (6-7/10). There is no loss of strength. When she lies down at night, she has a lot of cramping. No numbness is noted. No burning in her feet or left leg. Prolonged standing worsens her discomfort. She does not wear super tight jeans.\par Reviewed her labs and noted vitamin D to be low.

## 2021-05-18 NOTE — REASON FOR VISIT
[Family Member] : family member [[facility]] : [phone] [FreeTextEntry2] : Rachael  [TWNoteComboBox1] : [place]

## 2021-09-07 ENCOUNTER — APPOINTMENT (OUTPATIENT)
Dept: HEART AND VASCULAR | Facility: CLINIC | Age: 61
End: 2021-09-07
Payer: MEDICAID

## 2021-09-07 VITALS
BODY MASS INDEX: 29.41 KG/M2 | TEMPERATURE: 97.1 F | DIASTOLIC BLOOD PRESSURE: 75 MMHG | SYSTOLIC BLOOD PRESSURE: 152 MMHG | HEIGHT: 66 IN | WEIGHT: 183 LBS | HEART RATE: 60 BPM | RESPIRATION RATE: 16 BRPM | OXYGEN SATURATION: 97 %

## 2021-09-07 PROCEDURE — 99214 OFFICE O/P EST MOD 30 MIN: CPT

## 2021-09-07 NOTE — REASON FOR VISIT
[Initial Evaluation] : an initial evaluation of [FreeTextEntry1] : 60 year old F with history of HTN, family history of HTN here for routine followup. She was last seen March 2021.. She denies chest pain, dyspnea, orthopnea, PND, edema.  Her BP remains elevated today. \par \par Daughter here for translation. \par Aug 19, 2021: HDL 37; ; LDL 46; Total chol 106; A1C 5.8; CMP WNL\par Labs May 2020: CBC WNL; Total chol 196; HDL 36; ; ; A1C 5.6\par Labs 09/2019: A1C 5.6; CMP WNL; Total chol 151; HDL 43; ; LDL 85\par Labs reviewed 02/2019: CMP WNL; HDL 36; Total chol 210; ; ; A1C 5.4\par \par Echocardiogram 03/18/2019: Trace MR; Mild concentric LVH; LVEF 65%; Trace TR. PASP 21 mm Hg. \par \par EKG 03/04/2019 NSR at 61 bpm with STT wave inv I, aVL, V4-V6. \par EKG 05/09/2013: NSR at 79 bpm with nonspecific STT abn in I, aVL, V5-V6

## 2021-09-07 NOTE — PHYSICAL EXAM
[General Appearance - Well Developed] : well developed [Normal Appearance] : normal appearance [Well Groomed] : well groomed [General Appearance - Well Nourished] : well nourished [No Deformities] : no deformities [General Appearance - In No Acute Distress] : no acute distress [Normal Oral Mucosa] : normal oral mucosa [No Oral Pallor] : no oral pallor [No Oral Cyanosis] : no oral cyanosis [Respiration, Rhythm And Depth] : normal respiratory rhythm and effort [Exaggerated Use Of Accessory Muscles For Inspiration] : no accessory muscle use [Auscultation Breath Sounds / Voice Sounds] : lungs were clear to auscultation bilaterally [Heart Rate And Rhythm] : heart rate and rhythm were normal [Heart Sounds] : normal S1 and S2 [Systolic grade ___/6] : A grade [unfilled]/6 systolic murmur was heard. [Abdomen Soft] : soft [Abdomen Tenderness] : non-tender [Nail Clubbing] : no clubbing of the fingernails [Abdomen Mass (___ Cm)] : no abdominal mass palpated [Cyanosis, Localized] : no localized cyanosis [Petechial Hemorrhages (___cm)] : no petechial hemorrhages [] : no ischemic changes [Oriented To Time, Place, And Person] : oriented to person, place, and time [Affect] : the affect was normal [Mood] : the mood was normal [No Anxiety] : not feeling anxious

## 2021-09-07 NOTE — DISCUSSION/SUMMARY
[Hypertension] : hypertension [Not Responding to Treatment] : not responding to treatment [Exercise Regimen] : an exercise regimen [Weight Loss] : weight loss [___ Week(s)] : in [unfilled] week(s) [de-identified] : elevated [de-identified] : On Carvedilol 25 mg PO BID, Losartan 25 mg PO daily, Amlodipine 10 mg PO daily. Increase Losartan to 50 mg PO daily. Continue to keep BP log.

## 2021-11-15 ENCOUNTER — APPOINTMENT (OUTPATIENT)
Dept: NEUROLOGY | Facility: CLINIC | Age: 61
End: 2021-11-15
Payer: MEDICAID

## 2021-11-15 VITALS
SYSTOLIC BLOOD PRESSURE: 139 MMHG | HEIGHT: 62 IN | DIASTOLIC BLOOD PRESSURE: 81 MMHG | BODY MASS INDEX: 33.68 KG/M2 | WEIGHT: 183 LBS | HEART RATE: 60 BPM

## 2021-11-15 PROCEDURE — 99213 OFFICE O/P EST LOW 20 MIN: CPT

## 2021-11-16 NOTE — ASSESSMENT
[FreeTextEntry1] : Neurologic examination remained stable.\par \par She will continue gabapentin 600 mg twice daily.\par \par Encouraged weight loss.\par Followup in 6 months with Xochitl Reyes.

## 2021-11-16 NOTE — HISTORY OF PRESENT ILLNESS
[FreeTextEntry1] : 11/15/21\par Here in follow-up.  Notes burning is well controlled on the gabapentin.  At present she has no low back pain.  Her strength is normal.  Balance is stable.  She has not had any changes to her health history except alterations being made to her blood pressure medication.\par \par 5/18/21\par Here in f/u with daughter. Notes burning is less with gabapentin. Only occurs if she is on her feet a lot. \par No further cramps. No LBP. balance stable. strength normal.\par No changes to health history.\par \par 11/18/2020\par Patient is here in followup. She notes improvement in the sensation of burning with gabapentin 300 mg 3 times a day. She does not have any side effects to this. She says if she stands for a long time she starts noticing cramping in her right thigh. She does not have low back pain. She does not have severe pain in her legs. The numbness only occurs in her right lateral thigh. She did not have loss of strength in her legs. She does have a lot of cramps in her legs if she stands for too long and also at nighttime.\par \par 7/23/2020\par Sujey Jackson is a 59 year old woman seen for a follow up of right thigh burning.   aided during visit.\par She reports the numbness and feeling of burning is the same with Gabapentin.  She is taking 200mg of Gabapentin nightly, this is different than what was prescribed to her on the last visit.  She denies any weakness or that the pain disturbs her sleep.  Ms. Jackson does not endorse any back pain or history of diabetes.  \par \par The remaining neurological review of systems is negative. \par \par 5/20/20: This is a 58 y/o woman who is being seen in neurologic consultation for evaluation of right thigh burning. Pain is in the outer/lateral aspect of her thigh. This started about a year ago. She was placed on Ibuprofen which didn’t help as it increased her bp. she has no back pain. Pain is significant when she stands (6-7/10). There is no loss of strength. When she lies down at night, she has a lot of cramping. No numbness is noted. No burning in her feet or left leg. Prolonged standing worsens her discomfort. She does not wear super tight jeans.\par Reviewed her labs and noted vitamin D to be low.

## 2021-11-16 NOTE — REASON FOR VISIT
[Follow-Up: _____] : a [unfilled] follow-up visit [Family Member] : family member [[facility]En[facility]] : [phone] [FreeTextEntry2] : Rachael  [TWNoteComboBox1] : [place]

## 2021-11-16 NOTE — REASON FOR VISIT
[Follow-Up: _____] : a [unfilled] follow-up visit [Family Member] : family member [[facility]] : [phone] [FreeTextEntry2] : Rachael  [TWNoteComboBox1] : [place]

## 2021-11-16 NOTE — HISTORY OF PRESENT ILLNESS
[FreeTextEntry1] : 11/15/21\par Here in follow-up.  Notes burning is well controlled on the gabapentin.  At present she has no low back pain.  Her strength is normal.  Balance is stable.  She has not had any changes to her health history except alterations being made to her blood pressure medication.\par \par 5/18/21\par Here in f/u with daughter. Notes burning is less with gabapentin. Only occurs if she is on her feet a lot. \par No further cramps. No LBP. balance stable. strength normal.\par No changes to health history.\par \par 11/18/2020\par Patient is here in followup. She notes improvement in the sensation of burning with gabapentin 300 mg 3 times a day. She does not have any side effects to this. She says if she stands for a long time she starts noticing cramping in her right thigh. She does not have low back pain. She does not have severe pain in her legs. The numbness only occurs in her right lateral thigh. She did not have loss of strength in her legs. She does have a lot of cramps in her legs if she stands for too long and also at nighttime.\par \par 7/23/2020\par Sujey Jackson is a 59 year old woman seen for a follow up of right thigh burning.   aided during visit.\par She reports the numbness and feeling of burning is the same with Gabapentin.  She is taking 200mg of Gabapentin nightly, this is different than what was prescribed to her on the last visit.  She denies any weakness or that the pain disturbs her sleep.  Ms. Jackson does not endorse any back pain or history of diabetes.  \par \par The remaining neurological review of systems is negative. \par \par 5/20/20: This is a 60 y/o woman who is being seen in neurologic consultation for evaluation of right thigh burning. Pain is in the outer/lateral aspect of her thigh. This started about a year ago. She was placed on Ibuprofen which didn’t help as it increased her bp. she has no back pain. Pain is significant when she stands (6-7/10). There is no loss of strength. When she lies down at night, she has a lot of cramping. No numbness is noted. No burning in her feet or left leg. Prolonged standing worsens her discomfort. She does not wear super tight jeans.\par Reviewed her labs and noted vitamin D to be low.

## 2021-11-16 NOTE — PHYSICAL EXAM
[FreeTextEntry1] : Physical examination \par General: No acute distress, Awake, Alert.   \par \par Mental status \par Awake, alert, Normal attention span and concentration, Language intact.\par  Cranial Nerves \par II: VFF  \par III, IV, VI: PERRL, EOMI.   \par V: Facial sensation is normal B/L.   \par VII: Facial strength is normal B/L. \par VIII: Gross hearing is intact.   \par IX, X: Palate is midline and elevates symmetrically.   \par XI: Trapezius normal strength.   \par XII: Tongue midline without atrophy or fasciculations. \par \par Motor exam  \par Muscle tone - no evidence of rigidity or resistance in all 4 extremities.  \par No atrophy or fasciculations \par Muscle Strength: arms and legs, proximal and distal flexors and extensors are normal \par \par No UE drift.\par \par Reflexes \par All present, normal, and symmetrical.   \par 3+ patellar bilaterally. \par ankles: 1 + bilaterally\par \par Gait \par Normal gait.  \par \par \par

## 2021-12-07 ENCOUNTER — APPOINTMENT (OUTPATIENT)
Dept: HEART AND VASCULAR | Facility: CLINIC | Age: 61
End: 2021-12-07
Payer: MEDICAID

## 2021-12-07 VITALS
HEART RATE: 68 BPM | DIASTOLIC BLOOD PRESSURE: 84 MMHG | WEIGHT: 184 LBS | SYSTOLIC BLOOD PRESSURE: 132 MMHG | OXYGEN SATURATION: 98 % | BODY MASS INDEX: 33.86 KG/M2 | RESPIRATION RATE: 16 BRPM | HEIGHT: 62 IN

## 2021-12-07 PROCEDURE — 99214 OFFICE O/P EST MOD 30 MIN: CPT

## 2021-12-07 RX ORDER — UBIDECARENONE/VIT E ACET 100MG-5
CAPSULE ORAL
Refills: 0 | Status: DISCONTINUED | COMMUNITY
End: 2021-12-07

## 2021-12-07 RX ORDER — ATORVASTATIN CALCIUM 40 MG/1
40 TABLET, FILM COATED ORAL
Qty: 30 | Refills: 0 | Status: DISCONTINUED | COMMUNITY
Start: 2021-05-03 | End: 2021-12-07

## 2021-12-07 NOTE — DISCUSSION/SUMMARY
[Hypertension] : hypertension [Exercise Regimen] : an exercise regimen [Weight Loss] : weight loss [Stable] : stable [Responding to Treatment] : responding to treatment [___ Month(s)] : in [unfilled] month(s) [de-identified] : On Carvedilol 25 mg PO BID, Amlodipine 10 mg PO daily. Losartan to 50 mg PO daily. Continue to keep BP log.

## 2021-12-07 NOTE — REASON FOR VISIT
[Initial Evaluation] : an initial evaluation of [FreeTextEntry1] : 61 year old F with history of HTN, family history of HTN here for followup. She was last seen September 2021. She denies chest pain, dyspnea, orthopnea, PND, edema.  Her BP remains elevated today. \par \par Daughter here for translation. \par Aug 19, 2021: HDL 37; ; LDL 46; Total chol 106; A1C 5.8; CMP WNL\par Labs May 2020: CBC WNL; Total chol 196; HDL 36; ; ; A1C 5.6\par Labs 09/2019: A1C 5.6; CMP WNL; Total chol 151; HDL 43; ; LDL 85\par Labs reviewed 02/2019: CMP WNL; HDL 36; Total chol 210; ; ; A1C 5.4\par \par Echo 10/29/2020: Trace MR. Peak  LVOT gradient of 25 mm Hg, mean gradient 12 mm Hg, without evidence of obstruction due to hyperdynamic LV function. LVEF 60%; Mild concentric LVH; Normal DF; RVSP 32 mm Hg. Normal Right sided chambers and function. Mild TR. \par Echocardiogram 03/18/2019: Trace MR; Mild concentric LVH; LVEF 65%; Trace TR. PASP 21 mm Hg. \par \par EKG 03/04/2019 NSR at 61 bpm with STT wave inv I, aVL, V4-V6. \par EKG 05/09/2013: NSR at 79 bpm with nonspecific STT abn in I, aVL, V5-V6

## 2022-05-20 ENCOUNTER — APPOINTMENT (OUTPATIENT)
Dept: NEUROLOGY | Facility: CLINIC | Age: 62
End: 2022-05-20
Payer: MEDICAID

## 2022-05-20 VITALS
HEART RATE: 59 BPM | DIASTOLIC BLOOD PRESSURE: 74 MMHG | HEIGHT: 62 IN | WEIGHT: 182 LBS | BODY MASS INDEX: 33.49 KG/M2 | SYSTOLIC BLOOD PRESSURE: 126 MMHG

## 2022-05-20 PROCEDURE — 99213 OFFICE O/P EST LOW 20 MIN: CPT

## 2022-05-20 RX ORDER — GABAPENTIN 300 MG/1
300 CAPSULE ORAL
Qty: 90 | Refills: 2 | Status: DISCONTINUED | COMMUNITY
Start: 2020-11-16 | End: 2022-05-20

## 2022-05-20 NOTE — ASSESSMENT
[FreeTextEntry1] : Sujey Jackson is a 61 year old woman with meralgia paresthetic right side-now resolved.\par \par Pt advised to check with her other physicians regarding any interactions with Venosmil 200mg and her other medications. \par Advised against wearing any tight clothing or belts. \par \par Neurologic examination remained stable.\par \par Encouraged weight loss and walking for 30 minutes daily. \par \par She can follow up prn or with pain management if pain returns. \par \par Above discussed with Dr. Reddy.

## 2022-05-20 NOTE — PHYSICAL EXAM
[FreeTextEntry1] : Physical examination \par General: No acute distress, Awake, Alert.   \par \par Mental status \par Awake, alert, Normal attention span and concentration, Language intact.\par  Cranial Nerves \par II: VFF  \par III, IV, VI: PERRL, EOMI.   \par V: Facial sensation is normal B/L.   \par VII: Facial strength is normal B/L. \par VIII: Gross hearing is intact.   \par IX, X: Palate is midline and elevates symmetrically.   \par XI: Trapezius normal strength.   \par XII: Tongue midline without atrophy or fasciculations. \par \par Motor exam  \par Muscle tone - no evidence of rigidity or resistance in all 4 extremities.  \par No atrophy or fasciculations \par Muscle Strength: arms and legs, proximal and distal flexors and extensors are normal \par \par No UE drift.\par \par Reflexes \par All present, normal, and symmetrical.   \par 3+ patellar bilaterally. \par ankles: 3 + bilaterally\par \par Gait \par Normal gait.  Including heels, toe, and tandem. \par \par \par

## 2022-06-28 ENCOUNTER — APPOINTMENT (OUTPATIENT)
Dept: HEART AND VASCULAR | Facility: CLINIC | Age: 62
End: 2022-06-28

## 2022-06-28 VITALS
HEART RATE: 58 BPM | HEIGHT: 62 IN | WEIGHT: 176 LBS | BODY MASS INDEX: 32.39 KG/M2 | OXYGEN SATURATION: 98 % | TEMPERATURE: 97.6 F | SYSTOLIC BLOOD PRESSURE: 130 MMHG | DIASTOLIC BLOOD PRESSURE: 74 MMHG | RESPIRATION RATE: 16 BRPM

## 2022-06-28 PROCEDURE — 99214 OFFICE O/P EST MOD 30 MIN: CPT

## 2022-06-28 NOTE — DISCUSSION/SUMMARY
[Hypertension] : hypertension [Stable] : stable [Responding to Treatment] : responding to treatment [Exercise Regimen] : an exercise regimen [Weight Loss] : weight loss [___ Month(s)] : in [unfilled] month(s) [de-identified] : On Carvedilol 12.5 mg PO BID, Amlodipine 10 mg PO daily. Losartan to 50 mg PO daily. Continue to keep BP log.  [FreeTextEntry4] : check labs today                                                                                                                                                                                                                                                                                                                                                                                                                                                                                                                                                                                                                                                                                                                                                                                                                                                                                                                                                                                                                                                                                      v

## 2022-06-28 NOTE — REASON FOR VISIT
[Follow-Up - Clinic] : a clinic follow-up of [FreeTextEntry1] : 61 year old F with history of HTN, family history of HTN here for followup. She was last seen December 2021. She denies chest pain, dyspnea, orthopnea, PND, edema.  \par \par Daughter here for translation. \par Aug 19, 2021: HDL 37; ; LDL 46; Total chol 106; A1C 5.8; CMP WNL\par Labs May 2020: CBC WNL; Total chol 196; HDL 36; ; ; A1C 5.6\par Labs 09/2019: A1C 5.6; CMP WNL; Total chol 151; HDL 43; ; LDL 85\par Labs reviewed 02/2019: CMP WNL; HDL 36; Total chol 210; ; ; A1C 5.4\par \par Echo 10/29/2020: Trace MR. Peak  LVOT gradient of 25 mm Hg, mean gradient 12 mm Hg, without evidence of obstruction due to hyperdynamic LV function. LVEF 60%; Mild concentric LVH; Normal DF; RVSP 32 mm Hg. Normal Right sided chambers and function. Mild TR. \par Echocardiogram 03/18/2019: Trace MR; Mild concentric LVH; LVEF 65%; Trace TR. PASP 21 mm Hg. \par \par EKG 03/04/2019 NSR at 61 bpm with STT wave inv I, aVL, V4-V6. \par EKG 05/09/2013: NSR at 79 bpm with nonspecific STT abn in I, aVL, V5-V6

## 2022-06-29 LAB
ALBUMIN SERPL ELPH-MCNC: 4.5 G/DL
ALP BLD-CCNC: 88 U/L
ALT SERPL-CCNC: 23 U/L
ANION GAP SERPL CALC-SCNC: 13 MMOL/L
AST SERPL-CCNC: 18 U/L
BASOPHILS # BLD AUTO: 0.04 K/UL
BASOPHILS NFR BLD AUTO: 0.6 %
BILIRUB SERPL-MCNC: 0.3 MG/DL
BUN SERPL-MCNC: 23 MG/DL
CALCIUM SERPL-MCNC: 10.2 MG/DL
CHLORIDE SERPL-SCNC: 104 MMOL/L
CHOLEST SERPL-MCNC: 215 MG/DL
CO2 SERPL-SCNC: 26 MMOL/L
CREAT SERPL-MCNC: 0.94 MG/DL
EGFR: 70 ML/MIN/1.73M2
EOSINOPHIL # BLD AUTO: 0.19 K/UL
EOSINOPHIL NFR BLD AUTO: 2.9 %
GLUCOSE SERPL-MCNC: 88 MG/DL
HCT VFR BLD CALC: 44.5 %
HDLC SERPL-MCNC: 38 MG/DL
HGB BLD-MCNC: 14.1 G/DL
IMM GRANULOCYTES NFR BLD AUTO: 0.2 %
LDLC SERPL CALC-MCNC: 117 MG/DL
LYMPHOCYTES # BLD AUTO: 1.91 K/UL
LYMPHOCYTES NFR BLD AUTO: 28.9 %
MAN DIFF?: NORMAL
MCHC RBC-ENTMCNC: 27.4 PG
MCHC RBC-ENTMCNC: 31.7 GM/DL
MCV RBC AUTO: 86.6 FL
MONOCYTES # BLD AUTO: 0.66 K/UL
MONOCYTES NFR BLD AUTO: 10 %
NEUTROPHILS # BLD AUTO: 3.81 K/UL
NEUTROPHILS NFR BLD AUTO: 57.4 %
NONHDLC SERPL-MCNC: 177 MG/DL
PLATELET # BLD AUTO: 230 K/UL
POTASSIUM SERPL-SCNC: 4.9 MMOL/L
PROT SERPL-MCNC: 7.2 G/DL
RBC # BLD: 5.14 M/UL
RBC # FLD: 13.3 %
SODIUM SERPL-SCNC: 143 MMOL/L
TRIGL SERPL-MCNC: 301 MG/DL
WBC # FLD AUTO: 6.62 K/UL

## 2022-06-30 DIAGNOSIS — E78.1 PURE HYPERGLYCERIDEMIA: ICD-10-CM

## 2022-06-30 LAB
ESTIMATED AVERAGE GLUCOSE: 117 MG/DL
HBA1C MFR BLD HPLC: 5.7 %

## 2022-06-30 RX ORDER — ATORVASTATIN CALCIUM 20 MG/1
20 TABLET, FILM COATED ORAL
Qty: 90 | Refills: 3 | Status: DISCONTINUED | COMMUNITY
Start: 2020-06-03 | End: 2022-06-30

## 2022-07-22 ENCOUNTER — APPOINTMENT (OUTPATIENT)
Dept: HEART AND VASCULAR | Facility: CLINIC | Age: 62
End: 2022-07-22

## 2022-07-22 PROCEDURE — 93306 TTE W/DOPPLER COMPLETE: CPT

## 2022-07-26 ENCOUNTER — NON-APPOINTMENT (OUTPATIENT)
Age: 62
End: 2022-07-26

## 2022-07-26 RX ORDER — ATORVASTATIN CALCIUM 40 MG/1
40 TABLET, FILM COATED ORAL
Qty: 90 | Refills: 3 | Status: DISCONTINUED | COMMUNITY
End: 2022-07-26

## 2022-12-27 ENCOUNTER — APPOINTMENT (OUTPATIENT)
Dept: HEART AND VASCULAR | Facility: CLINIC | Age: 62
End: 2022-12-27
Payer: MEDICAID

## 2023-01-31 ENCOUNTER — APPOINTMENT (OUTPATIENT)
Dept: HEART AND VASCULAR | Facility: CLINIC | Age: 63
End: 2023-01-31
Payer: MEDICAID

## 2023-01-31 VITALS
WEIGHT: 178 LBS | SYSTOLIC BLOOD PRESSURE: 130 MMHG | TEMPERATURE: 97.9 F | RESPIRATION RATE: 16 BRPM | DIASTOLIC BLOOD PRESSURE: 80 MMHG | BODY MASS INDEX: 32.76 KG/M2 | HEART RATE: 62 BPM | HEIGHT: 62 IN | OXYGEN SATURATION: 97 %

## 2023-01-31 PROCEDURE — 99214 OFFICE O/P EST MOD 30 MIN: CPT

## 2023-01-31 RX ORDER — LOSARTAN POTASSIUM 50 MG/1
50 TABLET, FILM COATED ORAL DAILY
Qty: 90 | Refills: 3 | Status: ACTIVE | COMMUNITY
Start: 2021-03-09 | End: 1900-01-01

## 2023-01-31 RX ORDER — CARVEDILOL 12.5 MG/1
12.5 TABLET, FILM COATED ORAL TWICE DAILY
Qty: 180 | Refills: 3 | Status: ACTIVE | COMMUNITY
Start: 2019-04-02

## 2023-01-31 RX ORDER — ATORVASTATIN CALCIUM 80 MG/1
80 TABLET, FILM COATED ORAL
Qty: 90 | Refills: 3 | Status: ACTIVE | COMMUNITY
Start: 2022-07-26 | End: 1900-01-01

## 2023-01-31 NOTE — REASON FOR VISIT
[FreeTextEntry1] : 62 year old F with history of HTN, family history of HTN here for followup. She was last seen December 2021. She denies chest pain, dyspnea, orthopnea, PND, edema.  \par \par  line used: Katharine [id number] \par \par June 2022: A1C 5.7; CBC WNL; ; HDL 38; Total chol 215; ; CMP WNL - Atorvastatin increased to 80 mg PO daily after these labs. \par Aug 19, 2021: HDL 37; ; LDL 46; Total chol 106; A1C 5.8; CMP WNL\par Labs May 2020: CBC WNL; Total chol 196; HDL 36; ; ; A1C 5.6\par Labs 09/2019: A1C 5.6; CMP WNL; Total chol 151; HDL 43; ; LDL 85\par Labs reviewed 02/2019: CMP WNL; HDL 36; Total chol 210; ; ; A1C 5.4\par \par Echo July 22 2022: Normal LV size, wall thickness and systolic function - 61%. No significant valvular disease. Normal RV size/systolic function. \par Echo 10/29/2020: Trace MR. Peak  LVOT gradient of 25 mm Hg, mean gradient 12 mm Hg, without evidence of obstruction due to hyperdynamic LV function. LVEF 60%; Mild concentric LVH; Normal DF; RVSP 32 mm Hg. Normal Right sided chambers and function. Mild TR. \par Echocardiogram 03/18/2019: Trace MR; Mild concentric LVH; LVEF 65%; Trace TR. PASP 21 mm Hg. \par \par EKG 03/04/2019 NSR at 61 bpm with STT wave inv I, aVL, V4-V6. \par EKG 05/09/2013: NSR at 79 bpm with nonspecific STT abn in I, aVL, V5-V6 [Follow-Up - Clinic] : a clinic follow-up of

## 2023-01-31 NOTE — DISCUSSION/SUMMARY
[Hypertension] : hypertension [Stable] : stable [Responding to Treatment] : responding to treatment [Exercise Regimen] : an exercise regimen [Weight Loss] : weight loss [___ Month(s)] : in [unfilled] month(s) [de-identified] : On Carvedilol 12.5 mg PO BID, Amlodipine 10 mg PO daily. Losartan to 50 mg PO daily. Continue to keep BP log.  [FreeTextEntry4] : check labs today                                                                                                                                                                                                                                                                                                                                                                                                                                                                                                                                                                                                                                                                                                                                                                                                                                                                                                                                                                                                                                                                                      v

## 2023-02-01 LAB
ALBUMIN SERPL ELPH-MCNC: 4.5 G/DL
ALP BLD-CCNC: 87 U/L
ALT SERPL-CCNC: 24 U/L
ANION GAP SERPL CALC-SCNC: 14 MMOL/L
AST SERPL-CCNC: 19 U/L
BILIRUB SERPL-MCNC: 0.3 MG/DL
BUN SERPL-MCNC: 20 MG/DL
CALCIUM SERPL-MCNC: 10.3 MG/DL
CHLORIDE SERPL-SCNC: 103 MMOL/L
CHOLEST SERPL-MCNC: 215 MG/DL
CO2 SERPL-SCNC: 28 MMOL/L
CREAT SERPL-MCNC: 1.14 MG/DL
EGFR: 55 ML/MIN/1.73M2
GLUCOSE SERPL-MCNC: 97 MG/DL
HDLC SERPL-MCNC: 39 MG/DL
LDLC SERPL CALC-MCNC: 116 MG/DL
NONHDLC SERPL-MCNC: 176 MG/DL
POTASSIUM SERPL-SCNC: 4.9 MMOL/L
PROT SERPL-MCNC: 6.8 G/DL
SODIUM SERPL-SCNC: 144 MMOL/L
TRIGL SERPL-MCNC: 297 MG/DL

## 2023-02-06 ENCOUNTER — NON-APPOINTMENT (OUTPATIENT)
Age: 63
End: 2023-02-06

## 2023-07-11 ENCOUNTER — APPOINTMENT (OUTPATIENT)
Dept: HEART AND VASCULAR | Facility: CLINIC | Age: 63
End: 2023-07-11
Payer: MEDICAID

## 2023-07-11 VITALS
DIASTOLIC BLOOD PRESSURE: 70 MMHG | SYSTOLIC BLOOD PRESSURE: 137 MMHG | WEIGHT: 187 LBS | HEART RATE: 64 BPM | HEIGHT: 62 IN | RESPIRATION RATE: 16 BRPM | OXYGEN SATURATION: 97 % | BODY MASS INDEX: 34.41 KG/M2

## 2023-07-11 PROCEDURE — 99214 OFFICE O/P EST MOD 30 MIN: CPT

## 2023-07-11 NOTE — REASON FOR VISIT
[Follow-Up - Clinic] : a clinic follow-up of [FreeTextEntry1] : 62 year old F with history of HTN, family history of HTN here for followup. She was last seen January 2023. Was supposed to start Vascepa, but didn’t.. She denies chest pain, dyspnea, orthopnea, PND, edema.  She is bothered by varicose veins. Wants to see Vascular. \par \par Daughter here for interpretation. \par \par Feb 2023: GFR 55; ; HDL 39; Total chol 215;  (this was on Atorvastatin 80 mg PO daily)\par June 2022: A1C 5.7; CBC WNL; ; HDL 38; Total chol 215; ; CMP WNL - Atorvastatin increased to 80 mg PO daily after these labs. \par Aug 19, 2021: HDL 37; ; LDL 46; Total chol 106; A1C 5.8; CMP WNL\par Labs May 2020: CBC WNL; Total chol 196; HDL 36; ; ; A1C 5.6\par Labs 09/2019: A1C 5.6; CMP WNL; Total chol 151; HDL 43; ; LDL 85\par Labs reviewed 02/2019: CMP WNL; HDL 36; Total chol 210; ; ; A1C 5.4\par \par Echo July 22 2022: Normal LV size, wall thickness and systolic function - 61%. No significant valvular disease. Normal RV size/systolic function. \par Echo 10/29/2020: Trace MR. Peak  LVOT gradient of 25 mm Hg, mean gradient 12 mm Hg, without evidence of obstruction due to hyperdynamic LV function. LVEF 60%; Mild concentric LVH; Normal DF; RVSP 32 mm Hg. Normal Right sided chambers and function. Mild TR. \par Echocardiogram 03/18/2019: Trace MR; Mild concentric LVH; LVEF 65%; Trace TR. PASP 21 mm Hg. \par \par EKG 03/04/2019 NSR at 61 bpm with STT wave inv I, aVL, V4-V6. \par EKG 05/09/2013: NSR at 79 bpm with nonspecific STT abn in I, aVL, V5-V6

## 2023-09-01 ENCOUNTER — APPOINTMENT (OUTPATIENT)
Dept: VASCULAR SURGERY | Facility: CLINIC | Age: 63
End: 2023-09-01
Payer: MEDICAID

## 2023-09-01 VITALS — DIASTOLIC BLOOD PRESSURE: 78 MMHG | SYSTOLIC BLOOD PRESSURE: 146 MMHG | HEART RATE: 61 BPM

## 2023-09-01 DIAGNOSIS — I87.2 VENOUS INSUFFICIENCY (CHRONIC) (PERIPHERAL): ICD-10-CM

## 2023-09-01 PROCEDURE — 99203 OFFICE O/P NEW LOW 30 MIN: CPT

## 2023-09-02 PROBLEM — I87.2 VENOUS INSUFFICIENCY OF RIGHT LOWER EXTREMITY: Status: ACTIVE | Noted: 2023-09-02

## 2023-09-02 NOTE — REVIEW OF SYSTEMS
[Fever] : no fever [Chills] : no chills [Lower Ext Edema] : no lower extremity edema [Limb Pain] : limb pain [Limb Swelling] : no limb swelling [As Noted in HPI] : as noted in HPI

## 2023-09-02 NOTE — ASSESSMENT
[FreeTextEntry1] : 63 yo female with a long standing history of right lateral thigh numbness. She was diagnosed with meralgia paraesthetica. She was prescribed Neurontin with little relief. She has spider veins bilaterally. I do not think her symptoms are secondary to spider veins. I discussed this in detail with the patient and her daughter through a .  A lower extremity venous duplex was ordered. She will follow up when the results of the duplex are available.

## 2023-09-02 NOTE — REASON FOR VISIT
[Initial Evaluation] : an initial evaluation [Family Member] : family member [FreeTextEntry1] : numbness right lateral thigh

## 2023-09-02 NOTE — PHYSICAL EXAM
[JVD] : no jugular venous distention  [Normal Breath Sounds] : Normal breath sounds [Normal Rate and Rhythm] : normal rate and rhythm [2+] : left 2+ [Ankle Swelling (On Exam)] : not present [Ankle Swelling Bilaterally] : bilaterally  [Varicose Veins Of Lower Extremities] : bilaterally [Ankle Swelling On The Right] : mild [] : bilaterally [Alert] : alert [Oriented to Person] : oriented to person [Oriented to Place] : oriented to place [Oriented to Time] : oriented to time [de-identified] : Awake and Alert [de-identified] : spider veins bl [de-identified] : Appropriate

## 2023-09-02 NOTE — HISTORY OF PRESENT ILLNESS
[FreeTextEntry1] : 63 yo female presents to the office with a chief complaint of numbness in her right lateral thigh. The patient reports a long standing history of right thigh numbness. She was initially seen by Dr. Reddy in 2020.  She reports that she was diagnosed with a meralgia paraesthetica. She reports that she was instructed to take Neurontin. Neurontin did not help her symptoms and she failed to follow up with neurology. She is taking Venosmil 200 mg daily which she was prescribed in the DR with some improvement. She has a history of spider veins. She denies a history of lower extremity edema. She denies a history of DVT or SVT.

## 2023-09-05 RX ORDER — AMLODIPINE BESYLATE 10 MG/1
10 TABLET ORAL DAILY
Qty: 90 | Refills: 3 | Status: ACTIVE | COMMUNITY
Start: 1900-01-01 | End: 1900-01-01

## 2023-10-04 ENCOUNTER — APPOINTMENT (OUTPATIENT)
Dept: HEART AND VASCULAR | Facility: CLINIC | Age: 63
End: 2023-10-04
Payer: MEDICAID

## 2023-10-04 PROCEDURE — 93971 EXTREMITY STUDY: CPT

## 2023-11-03 ENCOUNTER — APPOINTMENT (OUTPATIENT)
Dept: VASCULAR SURGERY | Facility: CLINIC | Age: 63
End: 2023-11-03
Payer: MEDICAID

## 2023-11-03 VITALS
HEART RATE: 73 BPM | DIASTOLIC BLOOD PRESSURE: 76 MMHG | WEIGHT: 186 LBS | SYSTOLIC BLOOD PRESSURE: 138 MMHG | BODY MASS INDEX: 34.23 KG/M2 | HEIGHT: 62 IN

## 2023-11-03 DIAGNOSIS — G57.11 MERALGIA PARESTHETICA, RIGHT LOWER LIMB: ICD-10-CM

## 2023-11-03 PROCEDURE — 99213 OFFICE O/P EST LOW 20 MIN: CPT

## 2024-01-01 NOTE — HISTORY OF PRESENT ILLNESS
[FreeTextEntry1] : Sujey Jackson is a 61 year old woman seen for a follow up of right thigh burning. \par \par She reports she has tapered off Gabapentin since her last visit due to it not helping. She went to the [place] and was given Venosmil 200mg daily which helped. She no longer having pain. Denies burning and any pain to the thigh area. Denies back pain. She reports she has lost some weight. Denies balance difficulties or cramps. Overall, she feels well. \par \par The remaining neurological review of systems is negative. \par \par 11/15/21\par Here in follow-up.  Notes burning is well controlled on the gabapentin.  At present she has no low back pain.  Her strength is normal.  Balance is stable.  She has not had any changes to her health history except alterations being made to her blood pressure medication.\par \par 5/18/21\par Here in f/u with daughter. Notes burning is less with gabapentin. Only occurs if she is on her feet a lot. \par No further cramps. No LBP. balance stable. strength normal.\par No changes to health history.\par \par 11/18/2020\par Patient is here in followup. She notes improvement in the sensation of burning with gabapentin 300 mg 3 times a day. She does not have any side effects to this. She says if she stands for a long time she starts noticing cramping in her right thigh. She does not have low back pain. She does not have severe pain in her legs. The numbness only occurs in her right lateral thigh. She did not have loss of strength in her legs. She does have a lot of cramps in her legs if she stands for too long and also at nighttime.\par \par 7/23/2020\par Sujey Jackson is a 59 year old woman seen for a follow up of right thigh burning.   aided during visit.\par She reports the numbness and feeling of burning is the same with Gabapentin.  She is taking 200mg of Gabapentin nightly, this is different than what was prescribed to her on the last visit.  She denies any weakness or that the pain disturbs her sleep.  Ms. Jackson does not endorse any back pain or history of diabetes.  \par \par The remaining neurological review of systems is negative. \par \par 5/20/20: This is a 58 y/o woman who is being seen in neurologic consultation for evaluation of right thigh burning. Pain is in the outer/lateral aspect of her thigh. This started about a year ago. She was placed on Ibuprofen which didn’t help as it increased her bp. she has no back pain. Pain is significant when she stands (6-7/10). There is no loss of strength. When she lies down at night, she has a lot of cramping. No numbness is noted. No burning in her feet or left leg. Prolonged standing worsens her discomfort. She does not wear super tight jeans.\par Reviewed her labs and noted vitamin D to be low.
2024 14:30

## 2024-01-16 ENCOUNTER — APPOINTMENT (OUTPATIENT)
Dept: HEART AND VASCULAR | Facility: CLINIC | Age: 64
End: 2024-01-16
Payer: MEDICAID

## 2024-01-16 VITALS
SYSTOLIC BLOOD PRESSURE: 135 MMHG | RESPIRATION RATE: 16 BRPM | WEIGHT: 185 LBS | DIASTOLIC BLOOD PRESSURE: 72 MMHG | HEART RATE: 72 BPM | TEMPERATURE: 98.3 F | BODY MASS INDEX: 34.04 KG/M2 | OXYGEN SATURATION: 96 % | HEIGHT: 62 IN

## 2024-01-16 PROCEDURE — 99214 OFFICE O/P EST MOD 30 MIN: CPT

## 2024-01-16 RX ORDER — ASPIRIN ENTERIC COATED TABLETS 81 MG 81 MG/1
81 TABLET, DELAYED RELEASE ORAL DAILY
Qty: 90 | Refills: 3 | Status: DISCONTINUED | COMMUNITY
End: 2024-01-16

## 2024-01-16 RX ORDER — ICOSAPENT ETHYL 1 G/1
1 CAPSULE ORAL
Qty: 360 | Refills: 0 | Status: DISCONTINUED | COMMUNITY
Start: 2023-02-06 | End: 2024-01-16

## 2024-01-16 NOTE — DISCUSSION/SUMMARY
[Hyperlipidemia] : hyperlipidemia [Diet Modification] : diet modification [Exercise] : exercise [Hypertension] : hypertension [Stable] : stable [Responding to Treatment] : responding to treatment [Exercise Regimen] : an exercise regimen [Weight Loss] : weight loss [___ Month(s)] : in [unfilled] month(s) [de-identified] : On Atorvastatin 40 mg PO daily and on Vascepa. Check labs today [de-identified] : On Carvedilol 12.5 mg PO BID, Amlodipine 10 mg PO daily. Losartan 50 mg PO daily. Continue to keep BP log.  [de-identified] : no significant PVD. No DVT or reflux noted by Doppler U/S

## 2024-01-16 NOTE — REASON FOR VISIT
[Follow-Up - Clinic] : a clinic follow-up of [FreeTextEntry1] : 63 year old F with history of HTN, family history of HTN here for follow-up. She was last seen July 2023. Was supposed to start Vascepa, but didn't.. She denies chest pain, dyspnea, orthopnea, PND, edema.  She has seen Vascular for right lateral thigh numbness. Vascular workup was unremarkable. She is on Neurontin for meralgia parasthetica. BP at home has been 's.   Daughter here for interpretation.   Feb 2023: GFR 55; ; HDL 39; Total chol 215;  (this was on Atorvastatin 80 mg PO daily) June 2022: A1C 5.7; CBC WNL; ; HDL 38; Total chol 215; ; CMP WNL - Atorvastatin increased to 80 mg PO daily after these labs.  Aug 19, 2021: HDL 37; ; LDL 46; Total chol 106; A1C 5.8; CMP WNL Labs May 2020: CBC WNL; Total chol 196; HDL 36; ; ; A1C 5.6 Labs 09/2019: A1C 5.6; CMP WNL; Total chol 151; HDL 43; ; LDL 85 Labs reviewed 02/2019: CMP WNL; HDL 36; Total chol 210; ; ; A1C 5.4  LE U/S B/L: No DVT, no reflux.  Echo July 22 2022: Normal LV size, wall thickness and systolic function - 61%. No significant valvular disease. Normal RV size/systolic function.  Echo 10/29/2020: Trace MR. Peak  LVOT gradient of 25 mm Hg, mean gradient 12 mm Hg, without evidence of obstruction due to hyperdynamic LV function. LVEF 60%; Mild concentric LVH; Normal DF; RVSP 32 mm Hg. Normal Right sided chambers and function. Mild TR.  Echocardiogram 03/18/2019: Trace MR; Mild concentric LVH; LVEF 65%; Trace TR. PASP 21 mm Hg.   EKG 03/04/2019 NSR at 61 bpm with STT wave inv I, aVL, V4-V6.  EKG 05/09/2013: NSR at 79 bpm with nonspecific STT abn in I, aVL, V5-V6

## 2024-01-17 LAB
ALBUMIN SERPL ELPH-MCNC: 4.3 G/DL
ALP BLD-CCNC: 89 U/L
ALT SERPL-CCNC: 25 U/L
ANION GAP SERPL CALC-SCNC: 12 MMOL/L
AST SERPL-CCNC: 20 U/L
BASOPHILS # BLD AUTO: 0.03 K/UL
BASOPHILS NFR BLD AUTO: 0.5 %
BILIRUB SERPL-MCNC: 0.2 MG/DL
BUN SERPL-MCNC: 21 MG/DL
CALCIUM SERPL-MCNC: 9.9 MG/DL
CHLORIDE SERPL-SCNC: 106 MMOL/L
CHOLEST SERPL-MCNC: 152 MG/DL
CO2 SERPL-SCNC: 28 MMOL/L
CREAT SERPL-MCNC: 1.25 MG/DL
EGFR: 49 ML/MIN/1.73M2
EOSINOPHIL # BLD AUTO: 0.21 K/UL
EOSINOPHIL NFR BLD AUTO: 3.3 %
GLUCOSE SERPL-MCNC: 94 MG/DL
HCT VFR BLD CALC: 43.8 %
HDLC SERPL-MCNC: 38 MG/DL
HGB BLD-MCNC: 14.1 G/DL
IMM GRANULOCYTES NFR BLD AUTO: 0.3 %
LDLC SERPL CALC-MCNC: 71 MG/DL
LYMPHOCYTES # BLD AUTO: 1.94 K/UL
LYMPHOCYTES NFR BLD AUTO: 30.9 %
MAN DIFF?: NORMAL
MCHC RBC-ENTMCNC: 27.6 PG
MCHC RBC-ENTMCNC: 32.2 GM/DL
MCV RBC AUTO: 85.7 FL
MONOCYTES # BLD AUTO: 0.57 K/UL
MONOCYTES NFR BLD AUTO: 9.1 %
NEUTROPHILS # BLD AUTO: 3.51 K/UL
NEUTROPHILS NFR BLD AUTO: 55.9 %
NONHDLC SERPL-MCNC: 114 MG/DL
PLATELET # BLD AUTO: 234 K/UL
POTASSIUM SERPL-SCNC: 4.4 MMOL/L
PROT SERPL-MCNC: 6.9 G/DL
RBC # BLD: 5.11 M/UL
RBC # FLD: 13.3 %
SODIUM SERPL-SCNC: 146 MMOL/L
TRIGL SERPL-MCNC: 267 MG/DL
WBC # FLD AUTO: 6.28 K/UL

## 2024-01-19 ENCOUNTER — NON-APPOINTMENT (OUTPATIENT)
Age: 64
End: 2024-01-19

## 2024-01-23 ENCOUNTER — APPOINTMENT (OUTPATIENT)
Dept: HEART AND VASCULAR | Facility: CLINIC | Age: 64
End: 2024-01-23
Payer: MEDICAID

## 2024-01-23 PROCEDURE — 36415 COLL VENOUS BLD VENIPUNCTURE: CPT

## 2024-01-24 LAB
CHOLEST SERPL-MCNC: 140 MG/DL
ESTIMATED AVERAGE GLUCOSE: 111 MG/DL
HBA1C MFR BLD HPLC: 5.5 %
HDLC SERPL-MCNC: 42 MG/DL
LDLC SERPL CALC-MCNC: 76 MG/DL
NONHDLC SERPL-MCNC: 98 MG/DL
TRIGL SERPL-MCNC: 120 MG/DL

## 2024-01-26 ENCOUNTER — NON-APPOINTMENT (OUTPATIENT)
Age: 64
End: 2024-01-26

## 2024-07-16 ENCOUNTER — APPOINTMENT (OUTPATIENT)
Dept: HEART AND VASCULAR | Facility: CLINIC | Age: 64
End: 2024-07-16